# Patient Record
Sex: MALE | Race: OTHER | NOT HISPANIC OR LATINO
[De-identification: names, ages, dates, MRNs, and addresses within clinical notes are randomized per-mention and may not be internally consistent; named-entity substitution may affect disease eponyms.]

---

## 2019-11-05 PROBLEM — Z00.00 ENCOUNTER FOR PREVENTIVE HEALTH EXAMINATION: Status: ACTIVE | Noted: 2019-11-05

## 2019-11-07 ENCOUNTER — APPOINTMENT (OUTPATIENT)
Dept: OTOLARYNGOLOGY | Facility: CLINIC | Age: 44
End: 2019-11-07
Payer: COMMERCIAL

## 2019-11-07 VITALS — WEIGHT: 215 LBS | BODY MASS INDEX: 34.55 KG/M2 | HEIGHT: 66 IN

## 2019-11-07 VITALS
HEART RATE: 56 BPM | OXYGEN SATURATION: 99 % | DIASTOLIC BLOOD PRESSURE: 63 MMHG | SYSTOLIC BLOOD PRESSURE: 100 MMHG | TEMPERATURE: 98.3 F

## 2019-11-07 DIAGNOSIS — J34.89 OTHER SPECIFIED DISORDERS OF NOSE AND NASAL SINUSES: ICD-10-CM

## 2019-11-07 PROCEDURE — 31575 DIAGNOSTIC LARYNGOSCOPY: CPT

## 2019-11-07 PROCEDURE — 99203 OFFICE O/P NEW LOW 30 MIN: CPT | Mod: 25

## 2019-11-07 NOTE — REVIEW OF SYSTEMS
[Patient Intake Form Reviewed] : Patient intake form was reviewed [Nasal Congestion] : nasal congestion [As Noted in HPI] : as noted in HPI [Negative] : Heme/Lymph

## 2019-11-25 NOTE — PHYSICAL EXAM
[Normal] : tympanic membranes are normal in both ears [] : septum deviated bilaterally [de-identified] : Deviated Nasal Septum. Turbinate Hypertrophy.  Post Nasal Drip. Makenzie Bullosa.  Patient C/O Halitosis.  Deep cryptic tonsils with tonsil stones.  Lingual Tonsil Hypertrophy  [de-identified] : Halitosis.  Deep cryptic tonsils with tonsil stones.  Lingual Tonsil Hypertrophy

## 2019-11-25 NOTE — REASON FOR VISIT
[Initial Evaluation] : an initial evaluation for [FreeTextEntry2] : Deviated Nasal Septum, Nasal obstruction and mouth breathing for the past 5 years.  Patient states his level of severity is a level 7 out of 10 and it occurs constant.  Patient states nothing helps to improve or worsens his Deviated Nasal Septum, Nasal obstruction and mouth breathing for the past 5 years.

## 2019-11-25 NOTE — CONSULT LETTER
[Dear  ___] : Dear  [unfilled], [Please see my note below.] : Please see my note below. [Consult Letter:] : I had the pleasure of evaluating your patient, [unfilled]. [Sincerely,] : Sincerely, [Consult Closing:] : Thank you very much for allowing me to participate in the care of this patient.  If you have any questions, please do not hesitate to contact me. [FreeTextEntry3] : Darryl Langley MD, FACS\par Professor of Otolaryngology, [employer] at [facility]\par Director, [facility], [facility] [place]\par , [facility] Service Line, [facility]\par

## 2019-11-25 NOTE — HISTORY OF PRESENT ILLNESS
[de-identified] : 44 years old male patient with history of Deviated Nasal Septum, Nasal obstruction and mouth breathing for the past 5 years.  Patient is present today in the office with Nasal Airway Obstructive  Deviated Nasal Septum. Turbinate Hypertrophy.  Post Nasal Drip. Makenzie Bullosa.  Patient C/O Halitosis.  Deep cryptic tonsils with tonsil stones.  Lingual Tonsil Hypertrophy

## 2019-11-25 NOTE — PROCEDURE
[Congested] : congested [Deviated to the Rt] : deviated to the right [Image(s) Captured] : image(s) captured and filed [Topical Lidocaine] : topical lidocaine [Serial Number: ___] : Serial Number: [unfilled] [Flexible Endoscope] : examined with the flexible endoscope [de-identified] :  Deviated Nasal Septum. Turbinate Hypertrophy.  Post Nasal Drip. Makenzie Bullosa.  Nasal Airway Obstructive.\par  Deep cryptic tonsils with tonsil stones.   Lingual Tonsil Hypertrophy

## 2019-12-11 RX ORDER — LIDOCAINE HYDROCHLORIDE 20 MG/ML
2 SOLUTION ORAL; TOPICAL
Qty: 1 | Refills: 10 | Status: ACTIVE | COMMUNITY
Start: 2019-12-11 | End: 1900-01-01

## 2019-12-11 RX ORDER — DOCUSATE SODIUM 100 MG/1
100 CAPSULE ORAL TWICE DAILY
Qty: 1 | Refills: 0 | Status: ACTIVE | COMMUNITY
Start: 2019-12-11 | End: 1900-01-01

## 2019-12-11 RX ORDER — METHYLPREDNISOLONE 4 MG/1
4 TABLET ORAL
Qty: 1 | Refills: 0 | Status: ACTIVE | COMMUNITY
Start: 2019-12-11 | End: 1900-01-01

## 2019-12-12 ENCOUNTER — APPOINTMENT (OUTPATIENT)
Dept: OTOLARYNGOLOGY | Facility: AMBULATORY SURGERY CENTER | Age: 44
End: 2019-12-12

## 2019-12-12 ENCOUNTER — RESULT REVIEW (OUTPATIENT)
Age: 44
End: 2019-12-12

## 2019-12-12 ENCOUNTER — OUTPATIENT (OUTPATIENT)
Dept: OUTPATIENT SERVICES | Facility: HOSPITAL | Age: 44
LOS: 1 days | Discharge: HOME | End: 2019-12-12
Payer: COMMERCIAL

## 2019-12-12 PROCEDURE — 88300 SURGICAL PATH GROSS: CPT | Mod: 26

## 2019-12-12 PROCEDURE — 42826 REMOVAL OF TONSILS: CPT | Mod: 52

## 2019-12-12 PROCEDURE — 30117 REMOVAL OF INTRANASAL LESION: CPT

## 2019-12-12 PROCEDURE — 30520 REPAIR OF NASAL SEPTUM: CPT

## 2019-12-12 PROCEDURE — 30802 ABLATE INF TURBINATE SUBMUC: CPT

## 2019-12-18 LAB — SURGICAL PATHOLOGY STUDY: SIGNIFICANT CHANGE UP

## 2019-12-19 ENCOUNTER — APPOINTMENT (OUTPATIENT)
Dept: OTOLARYNGOLOGY | Facility: CLINIC | Age: 44
End: 2019-12-19
Payer: COMMERCIAL

## 2019-12-19 VITALS
OXYGEN SATURATION: 98 % | DIASTOLIC BLOOD PRESSURE: 72 MMHG | RESPIRATION RATE: 15 BRPM | SYSTOLIC BLOOD PRESSURE: 121 MMHG | TEMPERATURE: 97.8 F | HEART RATE: 68 BPM

## 2019-12-19 DIAGNOSIS — J32.8 OTHER CHRONIC SINUSITIS: ICD-10-CM

## 2019-12-19 DIAGNOSIS — J34.2 DEVIATED NASAL SEPTUM: ICD-10-CM

## 2019-12-19 DIAGNOSIS — J34.3 HYPERTROPHY OF NASAL TURBINATES: ICD-10-CM

## 2019-12-19 DIAGNOSIS — R07.0 PAIN IN THROAT: ICD-10-CM

## 2019-12-19 PROCEDURE — 99024 POSTOP FOLLOW-UP VISIT: CPT

## 2019-12-19 PROCEDURE — 31237 NSL/SINS NDSC SURG BX POLYPC: CPT | Mod: 50,58

## 2019-12-19 RX ORDER — AMOXICILLIN 500 MG/1
500 CAPSULE ORAL
Qty: 14 | Refills: 0 | Status: COMPLETED | COMMUNITY
Start: 2019-12-11 | End: 2019-12-19

## 2019-12-19 RX ORDER — ACETAMINOPHEN AND CODEINE 300; 30 MG/1; MG/1
300-30 TABLET ORAL
Qty: 30 | Refills: 0 | Status: COMPLETED | COMMUNITY
Start: 2019-12-11 | End: 2019-12-19

## 2019-12-19 NOTE — HISTORY OF PRESENT ILLNESS
[de-identified] : 44 years old male patient with history of Status post Tonsillotomy, Open SMR, Septoplasty and Turbinate Reduction.  Patient is present today in the office for nasal debridement and oral care.  Bacitracin ointment was inserted into patient nostrils for lubrication.  Patient was encouraged to increase oral hydration

## 2019-12-19 NOTE — PHYSICAL EXAM
[Normal] : tympanic membranes are normal in both ears [] : septum deviated bilaterally [de-identified] : Deviated Nasal Septum. Turbinate Hypertrophy.  Post Nasal Drip. Makenzie Bullosa.  Patient C/O Halitosis.  Deep cryptic tonsils with tonsil stones.  Lingual Tonsil Hypertrophy  [de-identified] : Halitosis.  Deep cryptic tonsils with tonsil stones.  Lingual Tonsil Hypertrophy

## 2019-12-19 NOTE — PROCEDURE
[Debridement] : debridement  [Bilateral] : bilateral debridement of the nasal cavity [Severe] : severe [Kristopher] : on both sides [Removed] : which was removed

## 2019-12-19 NOTE — REASON FOR VISIT
[Post-Operative Visit] : a post-operative visit [FreeTextEntry2] : Status post Tonsillotomy, Open SMR, Septoplasty and Turbinate Reduction

## 2019-12-19 NOTE — REVIEW OF SYSTEMS
[Patient Intake Form Reviewed] : Patient intake form was reviewed [Nasal Congestion] : nasal congestion [Throat Pain] : throat pain [As Noted in HPI] : as noted in HPI [Throat Dryness] : throat dryness [Negative] : Heme/Lymph

## 2019-12-30 ENCOUNTER — NON-APPOINTMENT (OUTPATIENT)
Age: 44
End: 2019-12-30

## 2019-12-30 ENCOUNTER — APPOINTMENT (OUTPATIENT)
Dept: OPHTHALMOLOGY | Facility: CLINIC | Age: 44
End: 2019-12-30
Payer: COMMERCIAL

## 2019-12-30 PROCEDURE — 92025 CPTRIZED CORNEAL TOPOGRAPHY: CPT

## 2019-12-30 PROCEDURE — 92004 COMPRE OPH EXAM NEW PT 1/>: CPT

## 2020-01-06 ENCOUNTER — APPOINTMENT (OUTPATIENT)
Dept: OTOLARYNGOLOGY | Facility: CLINIC | Age: 45
End: 2020-01-06
Payer: COMMERCIAL

## 2020-01-06 VITALS
OXYGEN SATURATION: 98 % | HEIGHT: 66 IN | SYSTOLIC BLOOD PRESSURE: 122 MMHG | BODY MASS INDEX: 34.55 KG/M2 | DIASTOLIC BLOOD PRESSURE: 80 MMHG | HEART RATE: 74 BPM | TEMPERATURE: 97.9 F | WEIGHT: 215 LBS

## 2020-01-06 PROCEDURE — 99024 POSTOP FOLLOW-UP VISIT: CPT

## 2020-01-06 NOTE — PHYSICAL EXAM
[Normal] : tympanic membranes are normal in both ears [] : septum deviated bilaterally [de-identified] : Deviated Nasal Septum. Turbinate Hypertrophy.  Post Nasal Drip. Makenzie Bullosa.  Patient C/O Halitosis.  Deep cryptic tonsils with tonsil stones.  Lingual Tonsil Hypertrophy  [de-identified] : Halitosis.  Deep cryptic tonsils with tonsil stones.  Lingual Tonsil Hypertrophy